# Patient Record
Sex: MALE | URBAN - METROPOLITAN AREA
[De-identification: names, ages, dates, MRNs, and addresses within clinical notes are randomized per-mention and may not be internally consistent; named-entity substitution may affect disease eponyms.]

---

## 2022-11-07 ENCOUNTER — HOSPITAL ENCOUNTER (EMERGENCY)
Age: 52
Discharge: HOME | End: 2022-11-07
Attending: EMERGENCY MEDICINE
Payer: COMMERCIAL

## 2022-11-07 VITALS
HEART RATE: 86 BPM | SYSTOLIC BLOOD PRESSURE: 159 MMHG | DIASTOLIC BLOOD PRESSURE: 104 MMHG | WEIGHT: 250 LBS | HEIGHT: 72 IN | RESPIRATION RATE: 16 BRPM | TEMPERATURE: 98.4 F | BODY MASS INDEX: 33.86 KG/M2 | OXYGEN SATURATION: 95 %

## 2022-11-07 DIAGNOSIS — T80.69XA SERUM SICKNESS DUE TO DRUG, INITIAL ENCOUNTER: Primary | ICD-10-CM

## 2022-11-07 LAB
ALBUMIN SERPL-MCNC: 3.9 G/DL (ref 3.4–5)
ALBUMIN/GLOB SERPL: 1 {RATIO} (ref 0.8–1.7)
ALP SERPL-CCNC: 88 U/L (ref 45–117)
ALT SERPL-CCNC: 487 U/L (ref 16–61)
ANION GAP SERPL CALC-SCNC: 6 MMOL/L (ref 3–18)
AST SERPL-CCNC: 241 U/L (ref 10–38)
BASOPHILS # BLD: 0.1 K/UL (ref 0–0.1)
BASOPHILS NFR BLD: 1 % (ref 0–2)
BILIRUB SERPL-MCNC: 0.7 MG/DL (ref 0.2–1)
BUN SERPL-MCNC: 17 MG/DL (ref 7–18)
BUN/CREAT SERPL: 14 (ref 12–20)
CALCIUM SERPL-MCNC: 8.8 MG/DL (ref 8.5–10.1)
CHLORIDE SERPL-SCNC: 101 MMOL/L (ref 100–111)
CO2 SERPL-SCNC: 29 MMOL/L (ref 21–32)
CREAT SERPL-MCNC: 1.24 MG/DL (ref 0.6–1.3)
DIFFERENTIAL METHOD BLD: ABNORMAL
EOSINOPHIL # BLD: 0.2 K/UL (ref 0–0.4)
EOSINOPHIL NFR BLD: 3 % (ref 0–5)
ERYTHROCYTE [DISTWIDTH] IN BLOOD BY AUTOMATED COUNT: 12.8 % (ref 11.6–14.5)
FLUAV AG NPH QL IA: NEGATIVE
FLUBV AG NOSE QL IA: NEGATIVE
GLOBULIN SER CALC-MCNC: 4.1 G/DL (ref 2–4)
GLUCOSE SERPL-MCNC: 122 MG/DL (ref 74–99)
HCT VFR BLD AUTO: 56.2 % (ref 36–48)
HGB BLD-MCNC: 19.7 G/DL (ref 13–16)
IMM GRANULOCYTES # BLD AUTO: 0 K/UL (ref 0–0.04)
IMM GRANULOCYTES NFR BLD AUTO: 0 % (ref 0–0.5)
INR PPP: 0.9 (ref 0.8–1.2)
LYMPHOCYTES # BLD: 1.7 K/UL (ref 0.9–3.6)
LYMPHOCYTES NFR BLD: 31 % (ref 21–52)
MCH RBC QN AUTO: 32.3 PG (ref 24–34)
MCHC RBC AUTO-ENTMCNC: 35.1 G/DL (ref 31–37)
MCV RBC AUTO: 92.3 FL (ref 78–100)
MONOCYTES # BLD: 0.7 K/UL (ref 0.05–1.2)
MONOCYTES NFR BLD: 13 % (ref 3–10)
NEUTS SEG # BLD: 2.7 K/UL (ref 1.8–8)
NEUTS SEG NFR BLD: 52 % (ref 40–73)
NRBC # BLD: 0 K/UL (ref 0–0.01)
NRBC BLD-RTO: 0 PER 100 WBC
PLATELET # BLD AUTO: 130 K/UL (ref 135–420)
PLATELET COMMENTS,PCOM: ABNORMAL
PMV BLD AUTO: 10.1 FL (ref 9.2–11.8)
POTASSIUM SERPL-SCNC: 3.9 MMOL/L (ref 3.5–5.5)
PROT SERPL-MCNC: 8 G/DL (ref 6.4–8.2)
PROTHROMBIN TIME: 12.6 SEC (ref 11.5–15.2)
RBC # BLD AUTO: 6.09 M/UL (ref 4.35–5.65)
RBC MORPH BLD: ABNORMAL
SODIUM SERPL-SCNC: 136 MMOL/L (ref 136–145)
WBC # BLD AUTO: 5.4 K/UL (ref 4.6–13.2)

## 2022-11-07 PROCEDURE — 85025 COMPLETE CBC W/AUTO DIFF WBC: CPT

## 2022-11-07 PROCEDURE — 74011250636 HC RX REV CODE- 250/636: Performed by: EMERGENCY MEDICINE

## 2022-11-07 PROCEDURE — 87804 INFLUENZA ASSAY W/OPTIC: CPT

## 2022-11-07 PROCEDURE — 96374 THER/PROPH/DIAG INJ IV PUSH: CPT

## 2022-11-07 PROCEDURE — 99284 EMERGENCY DEPT VISIT MOD MDM: CPT

## 2022-11-07 PROCEDURE — 85610 PROTHROMBIN TIME: CPT

## 2022-11-07 PROCEDURE — 80053 COMPREHEN METABOLIC PANEL: CPT

## 2022-11-07 RX ORDER — PREDNISONE 20 MG/1
20 TABLET ORAL 2 TIMES DAILY
Qty: 10 TABLET | Refills: 0 | Status: SHIPPED | OUTPATIENT
Start: 2022-11-07 | End: 2022-11-12

## 2022-11-07 RX ORDER — DOXYCYCLINE HYCLATE 100 MG
100 TABLET ORAL 2 TIMES DAILY
Qty: 20 TABLET | Refills: 0 | Status: SHIPPED | OUTPATIENT
Start: 2022-11-07 | End: 2022-11-17

## 2022-11-07 RX ORDER — DEXAMETHASONE SODIUM PHOSPHATE 4 MG/ML
10 INJECTION, SOLUTION INTRA-ARTICULAR; INTRALESIONAL; INTRAMUSCULAR; INTRAVENOUS; SOFT TISSUE ONCE
Status: COMPLETED | OUTPATIENT
Start: 2022-11-07 | End: 2022-11-07

## 2022-11-07 RX ORDER — DEXAMETHASONE SODIUM PHOSPHATE 10 MG/ML
10 INJECTION INTRAMUSCULAR; INTRAVENOUS ONCE
Status: DISCONTINUED | OUTPATIENT
Start: 2022-11-07 | End: 2022-11-07

## 2022-11-07 RX ORDER — CETIRIZINE HYDROCHLORIDE 10 MG/1
10 TABLET ORAL DAILY
Qty: 7 TABLET | Refills: 0 | Status: SHIPPED | OUTPATIENT
Start: 2022-11-07 | End: 2022-11-14

## 2022-11-07 RX ADMIN — DEXAMETHASONE SODIUM PHOSPHATE 10 MG: 4 INJECTION, SOLUTION INTRAMUSCULAR; INTRAVENOUS at 16:04

## 2022-11-07 NOTE — ED PROVIDER NOTES
I independently examined and evaluated Olive Briggs. History: This is a very pleasant 78-year-old male brought to the emergency department for evaluation of rash on bilateral forearms. Patient reports been usual state of health until approximately 6 days ago when symptoms restarted. He says he noticed he was having a spot on both of his forearms. It is gradually grown in size over the past several days. It surrounded by a pruritic rash on the ventral aspects of both of his forearms. Patient does report he finished a course of amoxicillin 1 week ago. Did not have any swelling of lips or tongue other rashes or difficulty swallowing or difficulty breathing never had any problems with antibiotics in the past.  Patient denied any other new exposures. He thought that he may have been bit by a spider or other insect which is why came to the emergency department for evaluation. Patient does report that after the rash on the forearms appeared he had a reaction where a felt very nauseated sick and decrease in appetite and generalized body aches. Subjective fevers. Physical exam:  General well-appearing well-nourished male in no acute distress  Chest: Regular rhythm no murmurs rubs or gallops  Abdomen: Soft nontender nondistended no rebound guarding peritoneal signs  Lungs: Clear to auscultation bilaterally no wheezes rales rhonchi or stridor  Neuro: Cranial nerves 2 through 12 gross intact no apparent motor or sensory deficit  Extremities: No obvious deformities or dislocations noted  Integument: Bilateral annular shaped rashes on left and right forearm. Surrounding erythema and edema. Micropapular rash outside of the rash. Central area of induration eschar noted. No lymphangitis appreciated    Medical decision making: This is a very pleasant 78-year-old male brought to the emergency department for evaluation of rash on bilateral forearms.   Based on patient's history and physical dizziness consistent with a delayed serum sickness reaction. Low clinical suspicion for insect bite or sting. No evidence of systemic illness at this time. Clinical Impression:  Delayed serum sickness reaction      All diagnostic, treatment, and disposition decisions were made by myself in conjunction with the advanced practice provider. I personally saw the patient and performed a substantive portion of the visit including all aspects of the medical decision making. For all further details of the patient's emergency department visit, please see the advanced practice provider's documentation. I personally saw and examined the patient. I have reviewed and agree with the residents findings, including all diagnostic interpretations, and plans as written. I was present during the key portions of separately billed procedures.   Bonilla Mohr MD

## 2022-11-07 NOTE — ED NOTES
Patient presents with 2 open areas on right and left forearm. Patient states he thinks he was bitten by a spider on Thursday 11/3/22. Denies pain in both open areas but does itch some at night per patient. Patient states he had some vomiting, fever and chills on Friday 11/4/22 Patient finished a round of Amoxicillin 1 week ago for an infected tooth.

## 2022-11-07 NOTE — ED TRIAGE NOTES
Patient c/o possible spider bite to bilateral forearms. He states noticing lesions on Thursday evening. He states that he was sick on Friday. He voices concern related to affected areas enlarging.

## 2022-11-07 NOTE — ED NOTES
EMERGENCY DEPARTMENT HISTORY AND PHYSICAL EXAM    4:06 PM      Date: 11/7/2022  Patient Name: Rekha Jorge    History of Presenting Illness     Chief Complaint   Patient presents with    Skin Problem         History Provided By: patient    Additional History (Context): Rekha Jorge is a 46 y.o. male presents with a skin rash for the past 4 days on both of his forearms. Patient states that he noticed the rash on Thursday, and was sick with vomiting, chills the next day lasting 24 hours. The patient conveys that he took amoxicillin 3 weeks ago for a tooth infection . Likely sick sinus syndrome     PCP: Other, None, MD         Current Outpatient Medications   Medication Sig Dispense Refill    cetirizine (ZyrTEC) 10 mg tablet Take 1 Tablet by mouth daily for 7 days. 7 Tablet 0    predniSONE (DELTASONE) 20 mg tablet Take 1 Tablet by mouth two (2) times a day for 5 days. With Breakfast 10 Tablet 0    doxycycline (VIBRA-TABS) 100 mg tablet Take 1 Tablet by mouth two (2) times a day for 10 days. 20 Tablet 0       Past History     Past Medical History:  Past Medical History:   Diagnosis Date    Epidermal cyst     Hyperplastic colon polyp        Past Surgical History:  History reviewed. No pertinent surgical history. Family History:  History reviewed. No pertinent family history. Social History:  Social History     Tobacco Use    Smoking status: Every Day     Types: Cigarettes    Smokeless tobacco: Never   Substance Use Topics    Alcohol use: Yes    Drug use: Never       Allergies:  No Known Allergies      Review of Systems     Review of Systems   Constitutional: Negative. HENT: Negative. Eyes: Negative. Respiratory: Negative. Cardiovascular: Negative. Gastrointestinal:  Positive for abdominal distention. Endocrine: Negative. Genitourinary: Negative. Musculoskeletal: Negative. Skin:  Positive for color change and rash. Allergic/Immunologic: Negative. Neurological: Negative. Hematological: Negative. Psychiatric/Behavioral: Negative. Physical Exam       Patient Vitals for the past 12 hrs:   Temp Pulse Resp BP SpO2   11/07/22 1450 98.4 °F (36.9 °C) 86 16 (!) 159/104 95 %       IPVITALS  Patient Vitals for the past 24 hrs:   BP Temp Pulse Resp SpO2 Height Weight   11/07/22 1450 (!) 159/104 98.4 °F (36.9 °C) 86 16 95 % 6' (1.829 m) 113.4 kg (250 lb)       Physical Exam  HENT:      Nose: Nose normal.      Mouth/Throat:      Mouth: Mucous membranes are moist.   Eyes:      Pupils: Pupils are equal, round, and reactive to light. Musculoskeletal:         General: Normal range of motion. Cervical back: Normal range of motion. Skin:     Findings: Lesion and rash present. Neurological:      General: No focal deficit present. Mental Status: He is alert. Psychiatric:         Mood and Affect: Mood normal.         Diagnostic Study Results   Labs -  Recent Results (from the past 24 hour(s))   CBC WITH AUTOMATED DIFF    Collection Time: 11/07/22  3:08 PM   Result Value Ref Range    WBC 5.4 4.6 - 13.2 K/uL    RBC 6.09 (H) 4.35 - 5.65 M/uL    HGB 19.7 (H) 13.0 - 16.0 g/dL    HCT 56.2 (HH) 36.0 - 48.0 %    MCV 92.3 78.0 - 100.0 FL    MCH 32.3 24.0 - 34.0 PG    MCHC 35.1 31.0 - 37.0 g/dL    RDW 12.8 11.6 - 14.5 %    PLATELET 264 (L) 912 - 420 K/uL    MPV 10.1 9.2 - 11.8 FL    NRBC 0.0 0  WBC    ABSOLUTE NRBC 0.00 0.00 - 0.01 K/uL    NEUTROPHILS PENDING %    LYMPHOCYTES PENDING %    MONOCYTES PENDING %    EOSINOPHILS PENDING %    BASOPHILS PENDING %    IMMATURE GRANULOCYTES PENDING %    ABS. NEUTROPHILS PENDING K/UL    ABS. LYMPHOCYTES PENDING K/UL    ABS. MONOCYTES PENDING K/UL    ABS. EOSINOPHILS PENDING K/UL    ABS. BASOPHILS PENDING K/UL    ABS. IMM. GRANS.  PENDING K/UL    DF PENDING    PROTHROMBIN TIME + INR    Collection Time: 11/07/22  3:08 PM   Result Value Ref Range    Prothrombin time 12.6 11.5 - 15.2 sec    INR 0.9 0.8 - 1.2     METABOLIC PANEL, COMPREHENSIVE Collection Time: 11/07/22  3:08 PM   Result Value Ref Range    Sodium 136 136 - 145 mmol/L    Potassium 3.9 3.5 - 5.5 mmol/L    Chloride 101 100 - 111 mmol/L    CO2 29 21 - 32 mmol/L    Anion gap 6 3.0 - 18 mmol/L    Glucose 122 (H) 74 - 99 mg/dL    BUN 17 7.0 - 18 MG/DL    Creatinine 1.24 0.6 - 1.3 MG/DL    BUN/Creatinine ratio 14 12 - 20      eGFR >60 >60 ml/min/1.73m2    Calcium 8.8 8.5 - 10.1 MG/DL    Bilirubin, total 0.7 0.2 - 1.0 MG/DL    ALT (SGPT) 487 (H) 16 - 61 U/L    AST (SGOT) 241 (H) 10 - 38 U/L    Alk. phosphatase 88 45 - 117 U/L    Protein, total 8.0 6.4 - 8.2 g/dL    Albumin 3.9 3.4 - 5.0 g/dL    Globulin 4.1 (H) 2.0 - 4.0 g/dL    A-G Ratio 1.0 0.8 - 1.7         Radiologic Studies -   No orders to display     No results found. Medications ordered:   Medications   dexamethasone (DECADRON) 4 mg/mL injection 10 mg (10 mg IntraVENous Given 11/7/22 1604)         Medical Decision Making   Initial Medical Decision Making and DDx:  CBC  CMP  Pt/inr  DONE, PATUIENT STABLE AND WILL BE DISCHARGED HOME   PATIENT ENCOURAGED TO FOLLOW UP WITH PCP   LIKELY SIK SINUS SYNDROME DUE TO AMOXICILLIN USE IN THE PAST 3 WEEKS. ED Course: Progress Notes, Reevaluation, and Consults:         I am the first provider for this patient. I reviewed the vital signs, available nursing notes, past medical history, past surgical history, family history and social history. Patient Vitals for the past 12 hrs:   Temp Pulse Resp BP SpO2   11/07/22 1450 98.4 °F (36.9 °C) 86 16 (!) 159/104 95 %       Vital Signs-Reviewed the patient's vital signs. Pulse Oximetry Analysis, Cardiac Monitor, 12 lead ekg:      Interpreted by the EP. Records Reviewed: Nursing notes reviewed (Time of Review: 4:06 PM)    Procedures:   Critical Care Time:   Aspirin: (was aspirin given for stroke?)    Diagnosis     Clinical Impression:   1.  Serum sickness due to drug, initial encounter        Disposition: Discharged      Follow-up Information Follow up With Specialties Details Why Contact Info        PLEASE FOLLOW UP WITH PCP IN 3 DAYS             Patient's Medications   Start Taking    CETIRIZINE (ZYRTEC) 10 MG TABLET    Take 1 Tablet by mouth daily for 7 days. DOXYCYCLINE (VIBRA-TABS) 100 MG TABLET    Take 1 Tablet by mouth two (2) times a day for 10 days. PREDNISONE (DELTASONE) 20 MG TABLET    Take 1 Tablet by mouth two (2) times a day for 5 days.  With Breakfast   Continue Taking    No medications on file   These Medications have changed    No medications on file   Stop Taking    No medications on file     _______________________________    Notes:    Denae Wellington MD using Dragon dictation      _______________________________

## 2022-11-07 NOTE — ED NOTES
I have reviewed discharge instructions with the patient. The patient verbalized understanding. Current Discharge Medication List        START taking these medications    Details   cetirizine (ZyrTEC) 10 mg tablet Take 1 Tablet by mouth daily for 7 days. Qty: 7 Tablet, Refills: 0  Start date: 11/7/2022, End date: 11/14/2022      predniSONE (DELTASONE) 20 mg tablet Take 1 Tablet by mouth two (2) times a day for 5 days. With Breakfast  Qty: 10 Tablet, Refills: 0  Start date: 11/7/2022, End date: 11/12/2022      doxycycline (VIBRA-TABS) 100 mg tablet Take 1 Tablet by mouth two (2) times a day for 10 days.   Qty: 20 Tablet, Refills: 0  Start date: 11/7/2022, End date: 11/17/2022